# Patient Record
Sex: FEMALE | Race: WHITE | URBAN - METROPOLITAN AREA
[De-identification: names, ages, dates, MRNs, and addresses within clinical notes are randomized per-mention and may not be internally consistent; named-entity substitution may affect disease eponyms.]

---

## 2021-07-22 ENCOUNTER — APPOINTMENT (RX ONLY)
Dept: URBAN - METROPOLITAN AREA CLINIC 27 | Facility: CLINIC | Age: 36
Setting detail: DERMATOLOGY
End: 2021-07-22

## 2021-07-22 DIAGNOSIS — L81.3 CAFÉ AU LAIT SPOTS: ICD-10-CM

## 2021-07-22 PROCEDURE — 99202 OFFICE O/P NEW SF 15 MIN: CPT

## 2021-07-22 PROCEDURE — ? RECOMMENDATIONS

## 2021-07-22 PROCEDURE — ? COUNSELING

## 2021-07-22 ASSESSMENT — LOCATION SIMPLE DESCRIPTION DERM: LOCATION SIMPLE: LEFT BUTTOCK

## 2021-07-22 ASSESSMENT — LOCATION DETAILED DESCRIPTION DERM: LOCATION DETAILED: LEFT BUTTOCK

## 2021-07-22 ASSESSMENT — LOCATION ZONE DERM: LOCATION ZONE: TRUNK

## 2021-07-22 NOTE — HPI: SKIN LESION
What Type Of Note Output Would You Prefer (Optional)?: Bullet Format
How Severe Is Your Skin Lesion?: mild
Is This A New Presentation, Or A Follow-Up?: Skin Lesion
Additional History: Patient would like for this lesion to be removed

## 2021-07-22 NOTE — PROCEDURE: RECOMMENDATIONS
Recommendations (Free Text): Patient may schedule for JENNA laser at [place] location. Recommendations (Free Text): Patient may schedule for JENNA laser at [place] location.

## 2022-02-02 ENCOUNTER — APPOINTMENT (RX ONLY)
Dept: URBAN - METROPOLITAN AREA CLINIC 23 | Facility: CLINIC | Age: 37
Setting detail: DERMATOLOGY
End: 2022-02-02

## 2022-02-02 DIAGNOSIS — Z41.9 ENCOUNTER FOR PROCEDURE FOR PURPOSES OTHER THAN REMEDYING HEALTH STATE, UNSPECIFIED: ICD-10-CM

## 2022-02-02 PROCEDURE — ? ADDITIONAL NOTES

## 2022-02-02 PROCEDURE — ? PICOWAY LASER

## 2022-02-02 ASSESSMENT — LOCATION ZONE DERM: LOCATION ZONE: TRUNK

## 2022-02-02 ASSESSMENT — LOCATION SIMPLE DESCRIPTION DERM: LOCATION SIMPLE: LEFT BUTTOCK

## 2022-02-02 ASSESSMENT — LOCATION DETAILED DESCRIPTION DERM: LOCATION DETAILED: LEFT BUTTOCK

## 2022-02-02 NOTE — PROCEDURE: PICOWAY LASER
Pre-Procedure: Prior to proceeding the treatment areas were cleaned and all present put on their eye protection.
Fluence (J/Cm2): 2
Location Override: left buttock
Pulse Duration: 2.5 ms
Handpiece: Zoom
Price (Use Numbers Only, No Special Characters Or $): 200
Spot Size: 2.0 mm
Wavelength: 532 nm
Anesthesia Type: 1% lidocaine with epinephrine
Hide Pulse Duration?: No
Handpiece: n/a nm
Detail Level: Simple
Treatment Number: 0
Post-Procedure Care: Immediate endpoint: perifollicular erythema and mild frosting. Vaseline and ice applied. Post care reviewed with patient.
Spot Size: 7.0 mm
Treatment Number: 1
Frequency (Hz): 5 Hz
Consent: Written consent obtained, risks reviewed including but not limited to crusting, scabbing, blistering, scarring, darker or lighter pigmentary change, paradoxical hair regrowth, incomplete removal of hair and infection.
Post-Care Instructions: I reviewed with the patient in detail post-care instructions. Patient should avoid sun for a minimum of 4 weeks before and after treatment.
Fluence (J/Cm2): 0.46

## 2022-02-02 NOTE — PROCEDURE: ADDITIONAL NOTES
Additional Notes: Patient consent was obtained to proceed with the visit and recommended plan of care after discussion of all risks and benefits, including the risks of COVID-19 exposure.
Detail Level: Simple
Render Risk Assessment In Note?: no
Additional Notes: Suggested that patient cover with Vaseline and bandage to reduce irritation from friction due to location. Will increase Hz at next visit.

## 2022-03-02 ENCOUNTER — APPOINTMENT (RX ONLY)
Dept: URBAN - METROPOLITAN AREA CLINIC 23 | Facility: CLINIC | Age: 37
Setting detail: DERMATOLOGY
End: 2022-03-02

## 2022-03-02 DIAGNOSIS — Z41.9 ENCOUNTER FOR PROCEDURE FOR PURPOSES OTHER THAN REMEDYING HEALTH STATE, UNSPECIFIED: ICD-10-CM

## 2022-03-02 PROCEDURE — ? ADDITIONAL NOTES

## 2022-03-02 PROCEDURE — ? PICOWAY LASER

## 2022-03-02 ASSESSMENT — LOCATION SIMPLE DESCRIPTION DERM: LOCATION SIMPLE: LEFT BUTTOCK

## 2022-03-02 ASSESSMENT — LOCATION ZONE DERM: LOCATION ZONE: TRUNK

## 2022-03-02 ASSESSMENT — LOCATION DETAILED DESCRIPTION DERM: LOCATION DETAILED: LEFT BUTTOCK

## 2022-03-02 NOTE — HPI: COSMETIC (LASER BROWN SPOTS)
Have You Had Laser Removal Of Brown Spots Before?: has had previous treatment
When Outside In The Sun, Do You...: mostly burns, rarely tans
When Was Your Last Laser Treatment?: 2/2022

## 2022-03-02 NOTE — PROCEDURE: ADDITIONAL NOTES
Detail Level: Simple
Render Risk Assessment In Note?: no
Additional Notes: Applied Aquaphor and bandage. Will apply numbing cream prior to next treatment.
Additional Notes: Patient consent was obtained to proceed with the visit and recommended plan of care after discussion of all risks and benefits, including the risks of COVID-19 exposure.

## 2022-03-02 NOTE — PROCEDURE: PICOWAY LASER
Pre-Procedure: Prior to proceeding the treatment areas were cleaned and all present put on their eye protection.
Fluence (J/Cm2): 2
Location Override: left buttock
Pulse Duration: 2.5 ms
Handpiece: Zoom
Price (Use Numbers Only, No Special Characters Or $): 200
Spot Size: 2.0 mm
Wavelength: 532 nm
Anesthesia Type: 1% lidocaine with epinephrine
Hide Pulse Duration?: No
Handpiece: n/a nm
Detail Level: Simple
Treatment Number: 0
Post-Procedure Care: Immediate endpoint: perifollicular erythema and mild frosting. Vaseline and ice applied. Post care reviewed with patient.
Spot Size: 4.0 mm
Frequency (Hz): 6 Hz
Consent: Written consent obtained, risks reviewed including but not limited to crusting, scabbing, blistering, scarring, darker or lighter pigmentary change, paradoxical hair regrowth, incomplete removal of hair and infection.
Post-Care Instructions: I reviewed with the patient in detail post-care instructions. Patient should avoid sun for a minimum of 4 weeks before and after treatment.
Fluence (J/Cm2): 1

## 2022-04-13 ENCOUNTER — APPOINTMENT (RX ONLY)
Dept: URBAN - METROPOLITAN AREA CLINIC 23 | Facility: CLINIC | Age: 37
Setting detail: DERMATOLOGY
End: 2022-04-13

## 2022-04-13 DIAGNOSIS — Z41.9 ENCOUNTER FOR PROCEDURE FOR PURPOSES OTHER THAN REMEDYING HEALTH STATE, UNSPECIFIED: ICD-10-CM

## 2022-04-13 PROCEDURE — ? PICOWAY LASER

## 2022-04-13 PROCEDURE — ? ADDITIONAL NOTES

## 2022-04-13 PROCEDURE — ? TREATMENT REGIMEN

## 2022-04-13 ASSESSMENT — LOCATION ZONE DERM: LOCATION ZONE: TRUNK

## 2022-04-13 ASSESSMENT — LOCATION SIMPLE DESCRIPTION DERM: LOCATION SIMPLE: LEFT BUTTOCK

## 2022-04-13 ASSESSMENT — LOCATION DETAILED DESCRIPTION DERM: LOCATION DETAILED: LEFT BUTTOCK

## 2022-04-13 NOTE — PROCEDURE: PICOWAY LASER
Price (Use Numbers Only, No Special Characters Or $): 200
Spot Size: 2.0 mm
Pulse Duration: 2.5 ms
Post-Care Instructions: I reviewed with the patient in detail post-care instructions. Patient should avoid sun for a minimum of 4 weeks before and after treatment.
Location Override: left buttock
Fluence (J/Cm2): 2
Treatment Number: 0
Hide Pulse Duration?: No
Detail Level: Simple
Length Of Topical Anesthesia Application (Optional): 15 minutes
Post-Procedure Care: Immediate endpoint: perifollicular erythema and mild frosting. Vaseline and ice applied. Post care reviewed with patient.
Frequency (Hz): 6 Hz
Wavelength: 532 nm
Spot Size: 4.0 mm
Topical Anesthesia Type: 7% lidocaine, 7% tetracaine
Treatment Number: 3
Consent: Written consent obtained, risks reviewed including but not limited to crusting, scabbing, blistering, scarring, darker or lighter pigmentary change, paradoxical hair regrowth, incomplete removal of hair and infection.
Pre-Procedure: Prior to proceeding the treatment areas were cleaned and all present put on their eye protection.
Anesthesia Type: 1% lidocaine with epinephrine
Fluence (J/Cm2): 0.8
Handpiece: Zoom

## 2022-04-13 NOTE — PROCEDURE: TREATMENT REGIMEN
Plan: Apply Vaseline or Aquaphor and bandage to the area twice daily until healed. Avoid scratching at the area.
Detail Level: Simple

## 2022-04-13 NOTE — HPI: COSMETIC (LASER BROWN SPOTS)
Have You Had Laser Removal Of Brown Spots Before?: has had previous treatment
When Outside In The Sun, Do You...: mostly burns, rarely tans
When Was Your Last Laser Treatment?: 03/2022

## 2022-05-11 ENCOUNTER — APPOINTMENT (RX ONLY)
Dept: URBAN - METROPOLITAN AREA CLINIC 23 | Facility: CLINIC | Age: 37
Setting detail: DERMATOLOGY
End: 2022-05-11

## 2022-05-11 DIAGNOSIS — Z41.9 ENCOUNTER FOR PROCEDURE FOR PURPOSES OTHER THAN REMEDYING HEALTH STATE, UNSPECIFIED: ICD-10-CM

## 2022-05-11 PROCEDURE — ? PICOWAY LASER

## 2022-05-11 PROCEDURE — ? ADDITIONAL NOTES

## 2022-05-11 PROCEDURE — ? TREATMENT REGIMEN

## 2022-05-11 PROCEDURE — ? PRESCRIPTION MEDICATION MANAGEMENT

## 2022-05-11 ASSESSMENT — LOCATION ZONE DERM: LOCATION ZONE: TRUNK

## 2022-05-11 ASSESSMENT — LOCATION SIMPLE DESCRIPTION DERM: LOCATION SIMPLE: LEFT BUTTOCK

## 2022-05-11 ASSESSMENT — LOCATION DETAILED DESCRIPTION DERM: LOCATION DETAILED: LEFT BUTTOCK

## 2022-05-11 NOTE — HPI: COSMETIC (LASER BROWN SPOTS)
Have You Had Laser Removal Of Brown Spots Before?: has had previous treatment
When Outside In The Sun, Do You...: mostly burns, rarely tans
When Was Your Last Laser Treatment?: 4/2022

## 2022-05-11 NOTE — PROCEDURE: PRESCRIPTION MEDICATION MANAGEMENT
Initiate Treatment: Skin Medicinals Lightening Cream: Apply thin layer to affected area qhs once scabbing heals. (Patient is getting some post inflammatory pigmentary change most likely due to friction in area.)
Render In Strict Bullet Format?: No
Detail Level: Zone
Plan: Treated some areas of PIE with PDL using 7 mm spot size, 8.0 fluence, and 1.5 pulse duration (patient consented to treatment).

## 2022-06-08 ENCOUNTER — APPOINTMENT (RX ONLY)
Dept: URBAN - METROPOLITAN AREA CLINIC 23 | Facility: CLINIC | Age: 37
Setting detail: DERMATOLOGY
End: 2022-06-08

## 2022-06-08 DIAGNOSIS — Z41.9 ENCOUNTER FOR PROCEDURE FOR PURPOSES OTHER THAN REMEDYING HEALTH STATE, UNSPECIFIED: ICD-10-CM

## 2022-06-08 PROCEDURE — ? PICOWAY LASER

## 2022-06-08 PROCEDURE — ? TREATMENT REGIMEN

## 2022-06-08 PROCEDURE — ? PRESCRIPTION MEDICATION MANAGEMENT

## 2022-06-08 ASSESSMENT — LOCATION DETAILED DESCRIPTION DERM: LOCATION DETAILED: LEFT BUTTOCK

## 2022-06-08 ASSESSMENT — LOCATION SIMPLE DESCRIPTION DERM: LOCATION SIMPLE: LEFT BUTTOCK

## 2022-06-08 ASSESSMENT — LOCATION ZONE DERM: LOCATION ZONE: TRUNK

## 2022-06-08 NOTE — PROCEDURE: PICOWAY LASER
Price (Use Numbers Only, No Special Characters Or $): 75
Spot Size: 2.0 mm
Pulse Duration: 2.5 ms
Post-Care Instructions: I reviewed with the patient in detail post-care instructions. Patient should avoid sun for a minimum of 4 weeks before and after treatment.
Location Override: left buttock
Fluence (J/Cm2): 2
Treatment Number: 0
Hide Pulse Duration?: No
Detail Level: Simple
Length Of Topical Anesthesia Application (Optional): 15 minutes
Post-Procedure Care: Immediate endpoint: perifollicular erythema and mild frosting. Vaseline and ice applied. Post care reviewed with patient.
Frequency (Hz): 5 Hz
Wavelength: 532 nm
Spot Size: 4.0 mm
Topical Anesthesia Type: 7% lidocaine, 7% tetracaine
Treatment Number: 4
Consent: Written consent obtained, risks reviewed including but not limited to crusting, scabbing, blistering, scarring, darker or lighter pigmentary change, paradoxical hair regrowth, incomplete removal of hair and infection.
Pre-Procedure: Prior to proceeding the treatment areas were cleaned and all present put on their eye protection.
Anesthesia Type: 1% lidocaine with epinephrine
Fluence (J/Cm2): 0.7
Handpiece: Zoom

## 2022-06-08 NOTE — PROCEDURE: PRESCRIPTION MEDICATION MANAGEMENT
Render In Strict Bullet Format?: No
Continue Regimen: Skin Medicinals Lightening Cream: Apply thin layer to affected area qhs once scabbing heals. (Patient is getting some post inflammatory pigmentary change most likely due to friction in area.)
Detail Level: Zone
Plan: Only treated small area today to see how patient responds.

## 2022-06-08 NOTE — HPI: COSMETIC (LASER BROWN SPOTS)
Have You Had Laser Removal Of Brown Spots Before?: has had previous treatment
When Outside In The Sun, Do You...: rarely burns, mostly tans
When Was Your Last Laser Treatment?: 5/2022

## 2022-06-22 ENCOUNTER — RX ONLY (OUTPATIENT)
Age: 37
Setting detail: RX ONLY
End: 2022-06-22

## 2022-06-22 RX ORDER — TRIAMCINOLONE ACETONIDE 1 MG/G
CREAM TOPICAL
Qty: 80 | Refills: 1 | Status: ERX | COMMUNITY
Start: 2022-06-22

## 2022-08-03 ENCOUNTER — APPOINTMENT (RX ONLY)
Dept: URBAN - METROPOLITAN AREA CLINIC 23 | Facility: CLINIC | Age: 37
Setting detail: DERMATOLOGY
End: 2022-08-03

## 2022-08-03 DIAGNOSIS — Z41.9 ENCOUNTER FOR PROCEDURE FOR PURPOSES OTHER THAN REMEDYING HEALTH STATE, UNSPECIFIED: ICD-10-CM

## 2022-08-03 PROCEDURE — ? PICOWAY LASER

## 2022-08-03 PROCEDURE — ? PRESCRIPTION MEDICATION MANAGEMENT

## 2022-08-03 ASSESSMENT — LOCATION ZONE DERM: LOCATION ZONE: TRUNK

## 2022-08-03 ASSESSMENT — LOCATION SIMPLE DESCRIPTION DERM: LOCATION SIMPLE: LEFT BUTTOCK

## 2022-08-03 ASSESSMENT — LOCATION DETAILED DESCRIPTION DERM: LOCATION DETAILED: LEFT BUTTOCK

## 2022-08-03 NOTE — PROCEDURE: PICOWAY LASER
Price (Use Numbers Only, No Special Characters Or $): 200
Spot Size: 2.0 mm
Pulse Duration: 2.5 ms
Post-Care Instructions: I reviewed with the patient in detail post-care instructions. Patient should avoid sun for a minimum of 4 weeks before and after treatment.
Location Override: left buttock
Fluence (J/Cm2): 2
Treatment Number: 0
Hide Pulse Duration?: No
Detail Level: Simple
Length Of Topical Anesthesia Application (Optional): 15 minutes
Post-Procedure Care: Immediate endpoint: perifollicular erythema and mild frosting. Vaseline and ice applied. Post care reviewed with patient.
Frequency (Hz): 6 Hz
Wavelength: 532 nm
Spot Size: 4.0 mm
Topical Anesthesia Type: 7% lidocaine, 7% tetracaine
Treatment Number: 4
Consent: Written consent obtained, risks reviewed including but not limited to crusting, scabbing, blistering, scarring, darker or lighter pigmentary change, paradoxical hair regrowth, incomplete removal of hair and infection.
Pre-Procedure: Prior to proceeding the treatment areas were cleaned and all present put on their eye protection.
Anesthesia Type: 1% lidocaine with epinephrine
Fluence (J/Cm2): 0.8
Handpiece: Zoom

## 2022-08-03 NOTE — HPI: COSMETIC (LASER BROWN SPOTS)
Have You Had Laser Removal Of Brown Spots Before?: has had previous treatment
When Outside In The Sun, Do You...: mostly burns, rarely tans
When Was Your Last Laser Treatment?: 6/2022

## 2022-08-03 NOTE — PROCEDURE: PRESCRIPTION MEDICATION MANAGEMENT
Initiate Treatment: Apply Vaseline or Aquaphor and bandage to the area twice daily x 5 days. Then switch to triamcinolone 0.1% cream bid x 2 weeks. Then switch to Skin Medicinals Lightening Cream qhs.
Render In Strict Bullet Format?: No
Detail Level: Zone

## 2022-11-16 ENCOUNTER — APPOINTMENT (RX ONLY)
Dept: URBAN - METROPOLITAN AREA CLINIC 23 | Facility: CLINIC | Age: 37
Setting detail: DERMATOLOGY
End: 2022-11-16

## 2022-11-16 DIAGNOSIS — Z41.9 ENCOUNTER FOR PROCEDURE FOR PURPOSES OTHER THAN REMEDYING HEALTH STATE, UNSPECIFIED: ICD-10-CM

## 2022-11-16 PROCEDURE — ? PRESCRIPTION MEDICATION MANAGEMENT

## 2022-11-16 PROCEDURE — ? PICOWAY LASER

## 2022-11-16 ASSESSMENT — LOCATION DETAILED DESCRIPTION DERM: LOCATION DETAILED: LEFT BUTTOCK

## 2022-11-16 ASSESSMENT — LOCATION ZONE DERM: LOCATION ZONE: TRUNK

## 2022-11-16 ASSESSMENT — LOCATION SIMPLE DESCRIPTION DERM: LOCATION SIMPLE: LEFT BUTTOCK

## 2022-11-16 NOTE — PROCEDURE: PRESCRIPTION MEDICATION MANAGEMENT
Initiate Treatment: Apply Vaseline or Aquaphor and bandage to the area twice daily x 5 days or until healed. Then switch to triamcinolone 0.1% cream bid x 2 weeks. Then switch to Skin Medicinals Lightening Cream qhs.\\n\\nAvoid picking or scratching at the area.
Render In Strict Bullet Format?: No
Detail Level: Zone

## 2022-11-16 NOTE — PROCEDURE: PICOWAY LASER
Price (Use Numbers Only, No Special Characters Or $): 200
Spot Size: 2.0 mm
Pulse Duration: 2.5 ms
Post-Care Instructions: I reviewed with the patient in detail post-care instructions. Patient should avoid sun for a minimum of 4 weeks before and after treatment.
Location Override: left buttock
Fluence (J/Cm2): 2
Treatment Number: 0
Hide Pulse Duration?: No
Detail Level: Simple
Length Of Topical Anesthesia Application (Optional): 15 minutes
Post-Procedure Care: Immediate endpoint: perifollicular erythema and mild frosting. Vaseline and ice applied. Post care reviewed with patient.
Frequency (Hz): 5 Hz
Wavelength: 532 nm
Spot Size: 3.0 mm
Topical Anesthesia Type: 7% lidocaine, 7% tetracaine
Treatment Number: 4
Consent: Written consent obtained, risks reviewed including but not limited to crusting, scabbing, blistering, scarring, darker or lighter pigmentary change, paradoxical hair regrowth, incomplete removal of hair and infection.
Pre-Procedure: Prior to proceeding the treatment areas were cleaned and all present put on their eye protection.
Anesthesia Type: 1% lidocaine with epinephrine
Fluence (J/Cm2): 1.2
Handpiece: Zoom